# Patient Record
Sex: FEMALE | Race: WHITE
[De-identification: names, ages, dates, MRNs, and addresses within clinical notes are randomized per-mention and may not be internally consistent; named-entity substitution may affect disease eponyms.]

---

## 2017-04-19 NOTE — NUR
Patient discharged with v/s stable. Written and verbal after care instructions 
given and explained. Patient alert, oriented and verbalized understanding of 
instructions. Ambulatory with steady gait. All questions addressed prior to 
discharge. ID band removed. Patient advised to follow up with PMD. Rx of ZOFRAN 
given. Patient educated on indication of medication including possible reaction 
and side effects. Opportunity to ask questions provided and answered.

## 2017-04-19 NOTE — NUR
PT PRESENT TO ER W.C/O NAUSEA, VOMITING, ABD PAIN, AND LOWER BACK PAIN X1 
WEEKS. 12 WEEKS PREG. 9/10 PAIN SCALE;DENIES CP/SOB/F/COUGH AT THIS TIME;DENIES 
ANY MEDICAL HX;AAOX4;NO ACUTE DISTRESS NOTED AT THIS TIME;DENIES 
SPOTTING/BLEEDING;STEADY GAIT;UNLABORED BREATHING W/ SYMMETRICAL CHEST 
EXPANSION;HOB ELEVATED;NEEDS ATTENDED;SAFETY MEASURES DONE;MD MADE AWARE OF 
PT'S CONDITION;

## 2017-10-23 ENCOUNTER — HOSPITAL ENCOUNTER (INPATIENT)
Dept: HOSPITAL 26 - MLD | Age: 27
LOS: 2 days | Discharge: HOME | DRG: 560 | End: 2017-10-25
Attending: OBSTETRICS & GYNECOLOGY | Admitting: OBSTETRICS & GYNECOLOGY
Payer: COMMERCIAL

## 2017-10-23 VITALS — DIASTOLIC BLOOD PRESSURE: 72 MMHG | SYSTOLIC BLOOD PRESSURE: 126 MMHG

## 2017-10-23 VITALS — BODY MASS INDEX: 27.66 KG/M2 | WEIGHT: 162 LBS | HEIGHT: 64 IN

## 2017-10-23 DIAGNOSIS — Z23: ICD-10-CM

## 2017-10-23 DIAGNOSIS — Z3A.39: ICD-10-CM

## 2017-10-23 LAB
BASOPHILS # BLD AUTO: 0.1 K/UL (ref 0–0.22)
BASOPHILS NFR BLD AUTO: 0.9 % (ref 0–2)
EOSINOPHIL # BLD AUTO: 0 K/UL (ref 0–0.4)
EOSINOPHIL NFR BLD AUTO: 0.2 % (ref 0–4)
ERYTHROCYTE [DISTWIDTH] IN BLOOD BY AUTOMATED COUNT: 15.6 % (ref 11.6–13.7)
HCT VFR BLD AUTO: 36.5 % (ref 36–48)
HGB BLD-MCNC: 11.7 G/DL (ref 12–16)
LYMPHOCYTES # BLD AUTO: 1.4 K/UL (ref 2.5–16.5)
LYMPHOCYTES NFR BLD AUTO: 9.8 % (ref 20.5–51.1)
MCH RBC QN AUTO: 23 PG (ref 27–31)
MCHC RBC AUTO-ENTMCNC: 32 G/DL (ref 33–37)
MCV RBC AUTO: 72 FL (ref 80–94)
MONOCYTES # BLD AUTO: 0.6 K/UL (ref 0.8–1)
MONOCYTES NFR BLD AUTO: 4 % (ref 1.7–9.3)
NEUTROPHILS # BLD AUTO: 12.3 K/UL (ref 1.8–7.7)
NEUTROPHILS NFR BLD AUTO: 85.1 % (ref 42.2–75.2)
PLATELET # BLD AUTO: 219 K/UL (ref 140–450)
RBC # BLD AUTO: 5.04 MIL/UL (ref 4.2–5.4)
WBC # BLD AUTO: 14.4 K/UL (ref 4.8–10.8)

## 2017-10-23 PROCEDURE — 10907ZC DRAINAGE OF AMNIOTIC FLUID, THERAPEUTIC FROM PRODUCTS OF CONCEPTION, VIA NATURAL OR ARTIFICIAL OPENING: ICD-10-PCS | Performed by: OBSTETRICS & GYNECOLOGY

## 2017-10-24 LAB
HCT VFR BLD AUTO: 32.1 % (ref 36–48)
HGB BLD-MCNC: 10.2 G/DL (ref 12–16)

## 2017-10-24 PROCEDURE — 3E0234Z INTRODUCTION OF SERUM, TOXOID AND VACCINE INTO MUSCLE, PERCUTANEOUS APPROACH: ICD-10-PCS | Performed by: OBSTETRICS & GYNECOLOGY

## 2017-10-24 RX ADMIN — OXYCODONE AND ACETAMINOPHEN PRN TAB: 5; 325 TABLET ORAL at 08:21

## 2017-10-24 RX ADMIN — HYDROCODONE BITARTRATE AND ACETAMINOPHEN PRN TAB: 5; 325 TABLET ORAL at 14:10

## 2017-10-24 RX ADMIN — HYDROCODONE BITARTRATE AND ACETAMINOPHEN PRN TAB: 5; 325 TABLET ORAL at 21:42

## 2017-10-24 RX ADMIN — OXYCODONE AND ACETAMINOPHEN PRN TAB: 5; 325 TABLET ORAL at 01:50

## 2017-10-24 NOTE — NUR
PATIENT HAS BEEN SCREENED AND CATEGORIZED AS LOW NUTRITION RISK. PATIENT WILL BE SEEN WITHIN 
7 DAYS OF ADMISSION.



10/30/17



DANIEL LEOS RD

## 2017-10-25 RX ADMIN — HYDROCODONE BITARTRATE AND ACETAMINOPHEN PRN TAB: 5; 325 TABLET ORAL at 09:03

## 2017-10-25 RX ADMIN — HYDROCODONE BITARTRATE AND ACETAMINOPHEN PRN TAB: 5; 325 TABLET ORAL at 02:58

## 2017-10-25 RX ADMIN — HYDROCODONE BITARTRATE AND ACETAMINOPHEN PRN TAB: 5; 325 TABLET ORAL at 13:49

## 2020-01-18 ENCOUNTER — HOSPITAL ENCOUNTER (EMERGENCY)
Dept: HOSPITAL 26 - MED | Age: 30
LOS: 1 days | Discharge: HOME | End: 2020-01-19
Payer: COMMERCIAL

## 2020-01-18 VITALS — DIASTOLIC BLOOD PRESSURE: 75 MMHG | SYSTOLIC BLOOD PRESSURE: 126 MMHG

## 2020-01-18 VITALS — HEIGHT: 64 IN | BODY MASS INDEX: 33.38 KG/M2 | WEIGHT: 195.5 LBS

## 2020-01-18 DIAGNOSIS — O21.0: Primary | ICD-10-CM

## 2020-01-18 DIAGNOSIS — O23.41: ICD-10-CM

## 2020-01-18 DIAGNOSIS — Z79.899: ICD-10-CM

## 2020-01-18 PROCEDURE — 96375 TX/PRO/DX INJ NEW DRUG ADDON: CPT

## 2020-01-18 PROCEDURE — 81001 URINALYSIS AUTO W/SCOPE: CPT

## 2020-01-18 PROCEDURE — 96365 THER/PROPH/DIAG IV INF INIT: CPT

## 2020-01-18 PROCEDURE — 87086 URINE CULTURE/COLONY COUNT: CPT

## 2020-01-18 PROCEDURE — 96361 HYDRATE IV INFUSION ADD-ON: CPT

## 2020-01-18 PROCEDURE — 76801 OB US < 14 WKS SINGLE FETUS: CPT

## 2020-01-18 PROCEDURE — 80053 COMPREHEN METABOLIC PANEL: CPT

## 2020-01-18 PROCEDURE — 36415 COLL VENOUS BLD VENIPUNCTURE: CPT

## 2020-01-18 PROCEDURE — 84702 CHORIONIC GONADOTROPIN TEST: CPT

## 2020-01-18 PROCEDURE — 85025 COMPLETE CBC W/AUTO DIFF WBC: CPT

## 2020-01-18 PROCEDURE — 96376 TX/PRO/DX INJ SAME DRUG ADON: CPT

## 2020-01-18 PROCEDURE — 99284 EMERGENCY DEPT VISIT MOD MDM: CPT

## 2020-01-18 NOTE — NUR
28 Y/O C/ O VOMITTING X 3 WEEKS. PT IS 13 WEEKS PREG. ABD IS ROUND, SOFT, 
NONTENDER, ACTIVE BS. VSS. A & O X4. STEADY GAIT. 5TH PREGNACY. DECREASE 
APPETTIE. NO DIAHRREA. DENIES ANY BLOOD IN URINE,BM, OR IN VOMIT.



NKA.



NO PMH

## 2020-01-19 VITALS — SYSTOLIC BLOOD PRESSURE: 126 MMHG | DIASTOLIC BLOOD PRESSURE: 75 MMHG

## 2020-01-19 LAB
ALBUMIN FLD-MCNC: 3.2 G/DL (ref 3.4–5)
ANION GAP SERPL CALCULATED.3IONS-SCNC: 16.2 MMOL/L (ref 8–16)
APPEARANCE UR: CLEAR
AST SERPL-CCNC: 4 U/L (ref 15–37)
BASOPHILS # BLD AUTO: 0.1 K/UL (ref 0–0.22)
BASOPHILS NFR BLD AUTO: 0.7 % (ref 0–2)
BILIRUB SERPL-MCNC: 0.3 MG/DL (ref 0–1)
BILIRUB UR QL STRIP: NEGATIVE
BUN SERPL-MCNC: 5 MG/DL (ref 7–18)
CHLORIDE SERPL-SCNC: 100 MMOL/L (ref 98–107)
CO2 SERPL-SCNC: 26.2 MMOL/L (ref 21–32)
COLOR UR: YELLOW
CREAT SERPL-MCNC: 0.6 MG/DL (ref 0.6–1.3)
EOSINOPHIL # BLD AUTO: 0 K/UL (ref 0–0.4)
EOSINOPHIL NFR BLD AUTO: 0.4 % (ref 0–4)
ERYTHROCYTE [DISTWIDTH] IN BLOOD BY AUTOMATED COUNT: 15.8 % (ref 11.6–13.7)
GFR SERPL CREATININE-BSD FRML MDRD: 152 ML/MIN (ref 90–?)
GLUCOSE SERPL-MCNC: 73 MG/DL (ref 74–106)
GLUCOSE UR STRIP-MCNC: NEGATIVE MG/DL
HCT VFR BLD AUTO: 37.8 % (ref 36–48)
HGB BLD-MCNC: 12.3 G/DL (ref 12–16)
HGB UR QL STRIP: NEGATIVE
LEUKOCYTE ESTERASE UR QL STRIP: NEGATIVE
LYMPHOCYTES # BLD AUTO: 1.9 K/UL (ref 2.5–16.5)
LYMPHOCYTES NFR BLD AUTO: 24.6 % (ref 20.5–51.1)
MCH RBC QN AUTO: 24 PG (ref 27–31)
MCHC RBC AUTO-ENTMCNC: 33 G/DL (ref 33–37)
MCV RBC AUTO: 74.6 FL (ref 80–94)
MONOCYTES # BLD AUTO: 0.4 K/UL (ref 0.8–1)
MONOCYTES NFR BLD AUTO: 5.5 % (ref 1.7–9.3)
NEUTROPHILS # BLD AUTO: 5.2 K/UL (ref 1.8–7.7)
NEUTROPHILS NFR BLD AUTO: 68.8 % (ref 42.2–75.2)
NITRITE UR QL STRIP: NEGATIVE
PH UR STRIP: 6 [PH] (ref 5–9)
PLATELET # BLD AUTO: 226 K/UL (ref 140–450)
POTASSIUM SERPL-SCNC: 3.4 MMOL/L (ref 3.5–5.1)
RBC # BLD AUTO: 5.06 MIL/UL (ref 4.2–5.4)
RBC #/AREA URNS HPF: (no result) /HPF (ref 0–5)
SODIUM SERPL-SCNC: 139 MMOL/L (ref 136–145)
WBC # BLD AUTO: 7.6 K/UL (ref 4.8–10.8)
WBC,URINE: (no result) /HPF (ref 0–5)

## 2020-01-19 NOTE — NUR
Patient discharged with v/s stable. Written and verbal after care instructions 
given and explained. 

Patient alert, oriented and verbalized understanding of instructions. 
Ambulatory with steady gait. All questions addressed prior to discharge. ID 
band removed. Patient advised to follow up with PMD. Rx of MACROBID, ZOFRAN 
given. Patient educated on indication of medication including possible reaction 
and side effects. Opportunity to ask questions provided and answered.

## 2020-01-24 ENCOUNTER — HOSPITAL ENCOUNTER (EMERGENCY)
Dept: HOSPITAL 26 - MED | Age: 30
LOS: 1 days | Discharge: HOME | End: 2020-01-25
Payer: COMMERCIAL

## 2020-01-24 VITALS — DIASTOLIC BLOOD PRESSURE: 77 MMHG | SYSTOLIC BLOOD PRESSURE: 123 MMHG

## 2020-01-24 VITALS — HEIGHT: 64 IN | BODY MASS INDEX: 33.29 KG/M2 | WEIGHT: 195 LBS

## 2020-01-24 DIAGNOSIS — J98.01: ICD-10-CM

## 2020-01-24 DIAGNOSIS — O99.511: ICD-10-CM

## 2020-01-24 DIAGNOSIS — Z3A.13: ICD-10-CM

## 2020-01-24 DIAGNOSIS — O21.0: Primary | ICD-10-CM

## 2020-01-24 DIAGNOSIS — J11.1: ICD-10-CM

## 2020-01-24 PROCEDURE — 94640 AIRWAY INHALATION TREATMENT: CPT

## 2020-01-24 PROCEDURE — 96361 HYDRATE IV INFUSION ADD-ON: CPT

## 2020-01-24 PROCEDURE — 87804 INFLUENZA ASSAY W/OPTIC: CPT

## 2020-01-24 PROCEDURE — 96374 THER/PROPH/DIAG INJ IV PUSH: CPT

## 2020-01-24 PROCEDURE — 99284 EMERGENCY DEPT VISIT MOD MDM: CPT

## 2020-01-24 NOTE — NUR
PHENERGAN 25 MG PO UNAVAILABLE IN ED. HOUSE SUPERVISOR CALLED. MEDICATION 
RECEIVED AND ADMINISTERED TO PT.

## 2020-01-24 NOTE — NUR
PT 28 Y/O FEMALE BIB SELF FOR C/O FEVER X 3 DAYS AND COUGH X 3 WEEKS. PT ALSO 
HAS C/O N/V X 1 MONTH, "I CAN BARLEY EAT ANYTHING." PT ADMITS TO BEING 13 WEEKS 
PREGNANT. RESPIRATIONS ARE EVEN AND UNLABORED. NON PRODUCTIVE COUGH NOTED. LUNG 
SOUNDS CLEAR A/P. AFEBRILE. SKIN IS WARM AND DRY TO TOUCH. PT HAS C/O 
GENERALIZED BODY ACHES 6/10 PAIN. PT STATES SHE HAS BEEN TAKING TYLENOL FOR 
FEVER AND PAIN AT HOME. PT SITTING UPRIGHT IN BED. BED LOCKED AND IN LOWEST 
POSITION. 



MEDHX: NONE

ALLERGIES: NKA

## 2020-01-25 VITALS — DIASTOLIC BLOOD PRESSURE: 81 MMHG | SYSTOLIC BLOOD PRESSURE: 119 MMHG

## 2020-01-25 NOTE — NUR
Patient discharged with v/s stable. Written and verbal after care instructions 
given and explained. 

Patient alert, oriented and verbalized understanding of instructions. 
Ambulatory with steady gait. All questions addressed prior to discharge. ID 
band removed. Patient advised to follow up with PMD. Rx of PHENERGAN, ALBUTEROL 
given. Patient educated on indication of medication including possible reaction 
and side effects. Opportunity to ask questions provided and answered.

## 2020-07-15 ENCOUNTER — HOSPITAL ENCOUNTER (INPATIENT)
Dept: HOSPITAL 26 - MLD | Age: 30
LOS: 2 days | Discharge: HOME | DRG: 560 | End: 2020-07-17
Attending: OBSTETRICS & GYNECOLOGY | Admitting: OBSTETRICS & GYNECOLOGY
Payer: COMMERCIAL

## 2020-07-15 VITALS — HEIGHT: 64 IN | BODY MASS INDEX: 36.7 KG/M2 | WEIGHT: 215 LBS

## 2020-07-15 DIAGNOSIS — Z3A.38: ICD-10-CM

## 2020-07-15 DIAGNOSIS — Z20.828: ICD-10-CM

## 2020-07-15 LAB
ALBUMIN FLD-MCNC: 2.7 G/DL (ref 3.4–5)
ANION GAP SERPL CALCULATED.3IONS-SCNC: 16.8 MMOL/L (ref 8–16)
APPEARANCE UR: CLEAR
AST SERPL-CCNC: 6 U/L (ref 15–37)
BARBITURATES UR QL SCN: NEGATIVE NG/ML
BASOPHILS # BLD AUTO: 0 K/UL (ref 0–0.22)
BASOPHILS NFR BLD AUTO: 0.4 % (ref 0–2)
BENZODIAZ UR QL SCN: NEGATIVE NG/ML
BILIRUB SERPL-MCNC: 0.4 MG/DL (ref 0–1)
BILIRUB UR QL STRIP: NEGATIVE
BUN SERPL-MCNC: 4 MG/DL (ref 7–18)
BZE UR QL SCN: NEGATIVE NG/ML
CANNABINOIDS UR QL SCN: NEGATIVE NG/ML
CHLORIDE SERPL-SCNC: 105 MMOL/L (ref 98–107)
CO2 SERPL-SCNC: 21.8 MMOL/L (ref 21–32)
COLOR UR: YELLOW
CREAT SERPL-MCNC: 0.6 MG/DL (ref 0.6–1.3)
EOSINOPHIL # BLD AUTO: 0 K/UL (ref 0–0.4)
EOSINOPHIL NFR BLD AUTO: 0.1 % (ref 0–4)
ERYTHROCYTE [DISTWIDTH] IN BLOOD BY AUTOMATED COUNT: 16.5 % (ref 11.6–13.7)
GFR SERPL CREATININE-BSD FRML MDRD: 151 ML/MIN (ref 90–?)
GLUCOSE SERPL-MCNC: 80 MG/DL (ref 74–106)
GLUCOSE UR STRIP-MCNC: NEGATIVE MG/DL
HCT VFR BLD AUTO: 30.9 % (ref 36–48)
HGB BLD-MCNC: 9.6 G/DL (ref 12–16)
HGB UR QL STRIP: (no result)
LEUKOCYTE ESTERASE UR QL STRIP: (no result)
LYMPHOCYTES # BLD AUTO: 1.1 K/UL (ref 2.5–16.5)
LYMPHOCYTES NFR BLD AUTO: 11.2 % (ref 20.5–51.1)
MCH RBC QN AUTO: 19 PG (ref 27–31)
MCHC RBC AUTO-ENTMCNC: 31 G/DL (ref 33–37)
MCV RBC AUTO: 62.4 FL (ref 80–94)
MONOCYTES # BLD AUTO: 0.5 K/UL (ref 0.8–1)
MONOCYTES NFR BLD AUTO: 4.6 % (ref 1.7–9.3)
NEUTROPHILS # BLD AUTO: 8.5 K/UL (ref 1.8–7.7)
NEUTROPHILS NFR BLD AUTO: 83.7 % (ref 42.2–75.2)
NITRITE UR QL STRIP: NEGATIVE
OPIATES UR QL SCN: NEGATIVE NG/ML
PCP UR QL SCN: NEGATIVE NG/ML
PH UR STRIP: 7.5 [PH] (ref 5–9)
PLATELET # BLD AUTO: 296 K/UL (ref 140–450)
POTASSIUM SERPL-SCNC: 3.6 MMOL/L (ref 3.5–5.1)
RBC # BLD AUTO: 4.95 MIL/UL (ref 4.2–5.4)
RBC #/AREA URNS HPF: (no result) /HPF (ref 0–5)
SODIUM SERPL-SCNC: 140 MMOL/L (ref 136–145)
WBC # BLD AUTO: 10.1 K/UL (ref 4.8–10.8)

## 2020-07-15 PROCEDURE — G0378 HOSPITAL OBSERVATION PER HR: HCPCS

## 2020-07-15 RX ADMIN — Medication PRN MG: at 19:48

## 2020-07-15 RX ADMIN — DOCUSATE SODIUM PRN MG: 100 CAPSULE, LIQUID FILLED ORAL at 21:07

## 2020-07-16 LAB
HCT VFR BLD AUTO: 28.5 % (ref 36–48)
HGB BLD-MCNC: 8.8 G/DL (ref 12–16)

## 2020-07-16 RX ADMIN — Medication PRN MG: at 03:21

## 2020-07-16 RX ADMIN — Medication PRN MG: at 08:49

## 2020-07-16 RX ADMIN — DOCUSATE SODIUM PRN MG: 100 CAPSULE, LIQUID FILLED ORAL at 13:08

## 2020-07-16 RX ADMIN — OXYCODONE AND ACETAMINOPHEN PRN TAB: 5; 325 TABLET ORAL at 13:09

## 2020-07-16 RX ADMIN — Medication PRN MG: at 17:19

## 2020-07-16 NOTE — NUR
PATIENT HAS BEEN SCREENED AND CATEGORIZED AS LOW NUTRITION RISK. PATIENT WILL BE SEEN WITHIN 
7 DAYS OF ADMISSION.



7/22/20 



JULIANE HERNANDEZ RD

## 2020-07-17 RX ADMIN — OXYCODONE AND ACETAMINOPHEN PRN TAB: 5; 325 TABLET ORAL at 06:15

## 2020-07-17 RX ADMIN — DOCUSATE SODIUM PRN MG: 100 CAPSULE, LIQUID FILLED ORAL at 08:57

## 2020-07-17 RX ADMIN — OXYCODONE AND ACETAMINOPHEN PRN TAB: 5; 325 TABLET ORAL at 01:01

## 2020-07-17 RX ADMIN — Medication PRN MG: at 08:58
